# Patient Record
Sex: MALE | Race: WHITE | NOT HISPANIC OR LATINO | URBAN - METROPOLITAN AREA
[De-identification: names, ages, dates, MRNs, and addresses within clinical notes are randomized per-mention and may not be internally consistent; named-entity substitution may affect disease eponyms.]

---

## 2018-10-08 ENCOUNTER — APPOINTMENT (RX ONLY)
Dept: URBAN - METROPOLITAN AREA CLINIC 71 | Facility: CLINIC | Age: 43
Setting detail: DERMATOLOGY
End: 2018-10-08

## 2018-10-08 ENCOUNTER — APPOINTMENT (RX ONLY)
Dept: URBAN - METROPOLITAN AREA CLINIC 70 | Facility: CLINIC | Age: 43
Setting detail: DERMATOLOGY
End: 2018-10-08

## 2018-10-08 DIAGNOSIS — Z71.89 OTHER SPECIFIED COUNSELING: ICD-10-CM

## 2018-10-08 DIAGNOSIS — B07.8 OTHER VIRAL WARTS: ICD-10-CM

## 2018-10-08 DIAGNOSIS — L85.3 XEROSIS CUTIS: ICD-10-CM

## 2018-10-08 PROBLEM — I10 ESSENTIAL (PRIMARY) HYPERTENSION: Status: ACTIVE | Noted: 2018-10-08

## 2018-10-08 PROCEDURE — 17110 DESTRUCTION B9 LES UP TO 14: CPT

## 2018-10-08 PROCEDURE — ? LIQUID NITROGEN

## 2018-10-08 PROCEDURE — 99202 OFFICE O/P NEW SF 15 MIN: CPT | Mod: 25

## 2018-10-08 PROCEDURE — ? COUNSELING

## 2018-10-08 PROCEDURE — ? PRESCRIPTION

## 2018-10-08 RX ORDER — PHARMACY COMPOUNDING ACCESSORY
1 EACH MISCELLANEOUS QHS
Qty: 1 | Refills: 1 | Status: ERX | COMMUNITY
Start: 2018-10-08

## 2018-10-08 RX ADMIN — Medication 1: at 00:00

## 2018-10-08 ASSESSMENT — LOCATION SIMPLE DESCRIPTION DERM
LOCATION SIMPLE: RIGHT THUMB
LOCATION SIMPLE: RIGHT HAND
LOCATION SIMPLE: RIGHT UPPER BACK
LOCATION SIMPLE: RIGHT MIDDLE FINGER
LOCATION SIMPLE: RIGHT THUMB
LOCATION SIMPLE: LEFT HAND
LOCATION SIMPLE: RIGHT RING FINGER
LOCATION SIMPLE: LEFT INDEX FINGER
LOCATION SIMPLE: RIGHT RING FINGER
LOCATION SIMPLE: RIGHT HAND
LOCATION SIMPLE: RIGHT MIDDLE FINGER
LOCATION SIMPLE: LEFT INDEX FINGER
LOCATION SIMPLE: RIGHT UPPER BACK
LOCATION SIMPLE: LEFT HAND

## 2018-10-08 ASSESSMENT — LOCATION DETAILED DESCRIPTION DERM
LOCATION DETAILED: RIGHT DISTAL PALMAR MIDDLE FINGER
LOCATION DETAILED: LEFT DISTAL PALMAR INDEX FINGER
LOCATION DETAILED: LEFT ULNAR PALM
LOCATION DETAILED: LEFT DISTAL PALMAR INDEX FINGER
LOCATION DETAILED: RIGHT THENAR EMINENCE
LOCATION DETAILED: RIGHT DISTAL VENTRAL THUMB
LOCATION DETAILED: RIGHT DISTAL PALMAR RING FINGER
LOCATION DETAILED: RIGHT INFERIOR MEDIAL UPPER BACK
LOCATION DETAILED: RIGHT DISTAL PALMAR MIDDLE FINGER
LOCATION DETAILED: RIGHT THENAR EMINENCE
LOCATION DETAILED: LEFT DISTAL RADIAL PALMAR INDEX FINGER
LOCATION DETAILED: LEFT ULNAR PALM
LOCATION DETAILED: RIGHT DISTAL VENTRAL THUMB
LOCATION DETAILED: RIGHT INFERIOR MEDIAL UPPER BACK
LOCATION DETAILED: RIGHT DISTAL PALMAR RING FINGER
LOCATION DETAILED: LEFT DISTAL RADIAL PALMAR INDEX FINGER

## 2018-10-08 ASSESSMENT — LOCATION ZONE DERM
LOCATION ZONE: FINGER
LOCATION ZONE: TRUNK
LOCATION ZONE: HAND
LOCATION ZONE: TRUNK
LOCATION ZONE: HAND
LOCATION ZONE: FINGER

## 2018-10-08 NOTE — PROCEDURE: LIQUID NITROGEN
Post-Care Instructions: I reviewed with the patient in detail post-care instructions. Patient is to wear sunprotection, and avoid picking at any of the treated lesions. Pt may apply Vaseline to crusted or scabbing areas.
Include Z78.9 (Other Specified Conditions Influencing Health Status) As An Associated Diagnosis?: No
Number Of Freeze-Thaw Cycles: 3 freeze-thaw cycles
Consent: The patient's consent was obtained including but not limited to risks of crusting, scabbing, blistering, scarring, darker or lighter pigmentary change, recurrence, incomplete removal and infection.
Detail Level: Detailed
Medical Necessity Clause: This procedure was medically necessary because the lesions that were treated were:
Medical Necessity Information: It is in your best interest to select a reason for this procedure from the list below. All of these items fulfill various CMS LCD requirements except the new and changing color options.

## 2018-10-08 NOTE — PROCEDURE: LIQUID NITROGEN
Medical Necessity Clause: This procedure was medically necessary because the lesions that were treated were:
Include Z78.9 (Other Specified Conditions Influencing Health Status) As An Associated Diagnosis?: No
Medical Necessity Information: It is in your best interest to select a reason for this procedure from the list below. All of these items fulfill various CMS LCD requirements except the new and changing color options.
Number Of Freeze-Thaw Cycles: 3 freeze-thaw cycles
Post-Care Instructions: I reviewed with the patient in detail post-care instructions. Patient is to wear sunprotection, and avoid picking at any of the treated lesions. Pt may apply Vaseline to crusted or scabbing areas.
Detail Level: Detailed
Consent: The patient's consent was obtained including but not limited to risks of crusting, scabbing, blistering, scarring, darker or lighter pigmentary change, recurrence, incomplete removal and infection.

## 2018-11-12 ENCOUNTER — APPOINTMENT (RX ONLY)
Dept: URBAN - METROPOLITAN AREA CLINIC 71 | Facility: CLINIC | Age: 43
Setting detail: DERMATOLOGY
End: 2018-11-12

## 2018-11-12 ENCOUNTER — APPOINTMENT (RX ONLY)
Dept: URBAN - METROPOLITAN AREA CLINIC 70 | Facility: CLINIC | Age: 43
Setting detail: DERMATOLOGY
End: 2018-11-12

## 2018-11-12 DIAGNOSIS — B07.8 OTHER VIRAL WARTS: ICD-10-CM

## 2018-11-12 DIAGNOSIS — Z71.89 OTHER SPECIFIED COUNSELING: ICD-10-CM

## 2018-11-12 DIAGNOSIS — L85.3 XEROSIS CUTIS: ICD-10-CM

## 2018-11-12 PROBLEM — I10 ESSENTIAL (PRIMARY) HYPERTENSION: Status: ACTIVE | Noted: 2018-11-12

## 2018-11-12 PROCEDURE — ? LIQUID NITROGEN

## 2018-11-12 PROCEDURE — ? COUNSELING

## 2018-11-12 PROCEDURE — ? PRESCRIPTION

## 2018-11-12 PROCEDURE — 17110 DESTRUCTION B9 LES UP TO 14: CPT

## 2018-11-12 PROCEDURE — 99213 OFFICE O/P EST LOW 20 MIN: CPT | Mod: 25

## 2018-11-12 RX ORDER — PHARMACY COMPOUNDING ACCESSORY
1 EACH MISCELLANEOUS QHS
Qty: 1 | Refills: 1 | Status: ERX | COMMUNITY
Start: 2018-11-12

## 2018-11-12 RX ADMIN — Medication 1: at 00:00

## 2018-11-12 ASSESSMENT — LOCATION DETAILED DESCRIPTION DERM
LOCATION DETAILED: LEFT DISTAL PALMAR INDEX FINGER
LOCATION DETAILED: RIGHT INFERIOR MEDIAL UPPER BACK
LOCATION DETAILED: RIGHT THENAR EMINENCE
LOCATION DETAILED: RIGHT PROXIMAL RADIAL DORSAL INDEX FINGER
LOCATION DETAILED: LEFT PROXIMAL DORSAL THUMB
LOCATION DETAILED: LEFT ULNAR PALM
LOCATION DETAILED: RIGHT PROXIMAL RADIAL DORSAL INDEX FINGER
LOCATION DETAILED: LEFT PROXIMAL DORSAL THUMB
LOCATION DETAILED: RIGHT DISTAL PALMAR MIDDLE FINGER
LOCATION DETAILED: RIGHT DISTAL PALMAR RING FINGER
LOCATION DETAILED: LEFT DISTAL ULNAR THUMB
LOCATION DETAILED: LEFT ULNAR PALM
LOCATION DETAILED: RIGHT DISTAL VENTRAL THUMB
LOCATION DETAILED: RIGHT DISTAL VENTRAL THUMB
LOCATION DETAILED: LEFT DISTAL ULNAR THUMB
LOCATION DETAILED: RIGHT DISTAL PALMAR MIDDLE FINGER
LOCATION DETAILED: RIGHT THENAR EMINENCE
LOCATION DETAILED: RIGHT DISTAL PALMAR RING FINGER
LOCATION DETAILED: RIGHT INFERIOR MEDIAL UPPER BACK
LOCATION DETAILED: LEFT DISTAL PALMAR INDEX FINGER

## 2018-11-12 ASSESSMENT — LOCATION SIMPLE DESCRIPTION DERM
LOCATION SIMPLE: RIGHT UPPER BACK
LOCATION SIMPLE: LEFT THUMB
LOCATION SIMPLE: LEFT HAND
LOCATION SIMPLE: RIGHT MIDDLE FINGER
LOCATION SIMPLE: RIGHT RING FINGER
LOCATION SIMPLE: LEFT HAND
LOCATION SIMPLE: LEFT INDEX FINGER
LOCATION SIMPLE: RIGHT UPPER BACK
LOCATION SIMPLE: LEFT THUMB
LOCATION SIMPLE: RIGHT INDEX FINGER
LOCATION SIMPLE: RIGHT RING FINGER
LOCATION SIMPLE: LEFT INDEX FINGER
LOCATION SIMPLE: RIGHT THUMB
LOCATION SIMPLE: RIGHT MIDDLE FINGER
LOCATION SIMPLE: RIGHT HAND
LOCATION SIMPLE: RIGHT INDEX FINGER
LOCATION SIMPLE: RIGHT THUMB
LOCATION SIMPLE: RIGHT HAND

## 2018-11-12 ASSESSMENT — LOCATION ZONE DERM
LOCATION ZONE: FINGER
LOCATION ZONE: HAND
LOCATION ZONE: HAND
LOCATION ZONE: TRUNK
LOCATION ZONE: TRUNK
LOCATION ZONE: FINGER

## 2018-11-12 NOTE — PROCEDURE: LIQUID NITROGEN
Consent: The patient's consent was obtained including but not limited to risks of crusting, scabbing, blistering, scarring, darker or lighter pigmentary change, recurrence, incomplete removal and infection.
Medical Necessity Information: It is in your best interest to select a reason for this procedure from the list below. All of these items fulfill various CMS LCD requirements except the new and changing color options.
Post-Care Instructions: I reviewed with the patient in detail post-care instructions. Patient is to wear sunprotection, and avoid picking at any of the treated lesions. Pt may apply Vaseline to crusted or scabbing areas.
Medical Necessity Clause: This procedure was medically necessary because the lesions that were treated were:
Add 52 Modifier (Optional): no
Total Number Of Lesions Treated: 5
Render Post Care In The Note?: yes
Duration Of Freeze Thaw-Cycle (Seconds): 0
Detail Level: Zone

## 2018-11-12 NOTE — PROCEDURE: LIQUID NITROGEN
Consent: The patient's consent was obtained including but not limited to risks of crusting, scabbing, blistering, scarring, darker or lighter pigmentary change, recurrence, incomplete removal and infection.
Total Number Of Lesions Treated: 5
Detail Level: Zone
Post-Care Instructions: I reviewed with the patient in detail post-care instructions. Patient is to wear sunprotection, and avoid picking at any of the treated lesions. Pt may apply Vaseline to crusted or scabbing areas.
Medical Necessity Information: It is in your best interest to select a reason for this procedure from the list below. All of these items fulfill various CMS LCD requirements except the new and changing color options.
Include Z78.9 (Other Specified Conditions Influencing Health Status) As An Associated Diagnosis?: No
Medical Necessity Clause: This procedure was medically necessary because the lesions that were treated were:
Duration Of Freeze Thaw-Cycle (Seconds): 0
Render Post Care In The Note?: yes

## 2018-12-03 ENCOUNTER — APPOINTMENT (RX ONLY)
Dept: URBAN - METROPOLITAN AREA CLINIC 70 | Facility: CLINIC | Age: 43
Setting detail: DERMATOLOGY
End: 2018-12-03

## 2018-12-03 ENCOUNTER — APPOINTMENT (RX ONLY)
Dept: URBAN - METROPOLITAN AREA CLINIC 71 | Facility: CLINIC | Age: 43
Setting detail: DERMATOLOGY
End: 2018-12-03

## 2018-12-03 DIAGNOSIS — L82.1 OTHER SEBORRHEIC KERATOSIS: ICD-10-CM

## 2018-12-03 DIAGNOSIS — B07.8 OTHER VIRAL WARTS: ICD-10-CM | Status: INADEQUATELY CONTROLLED

## 2018-12-03 DIAGNOSIS — Z71.89 OTHER SPECIFIED COUNSELING: ICD-10-CM

## 2018-12-03 PROCEDURE — ? PRESCRIPTION

## 2018-12-03 PROCEDURE — 99213 OFFICE O/P EST LOW 20 MIN: CPT | Mod: 25

## 2018-12-03 PROCEDURE — ? COUNSELING

## 2018-12-03 PROCEDURE — 17110 DESTRUCTION B9 LES UP TO 14: CPT

## 2018-12-03 PROCEDURE — ? LIQUID NITROGEN

## 2018-12-03 RX ORDER — PHARMACY COMPOUNDING ACCESSORY
1 EACH MISCELLANEOUS QHS
Qty: 1 | Refills: 1 | Status: CANCELLED
Stop reason: ENTERED-IN-ERROR

## 2018-12-03 ASSESSMENT — LOCATION ZONE DERM
LOCATION ZONE: TRUNK
LOCATION ZONE: HAND
LOCATION ZONE: FINGER
LOCATION ZONE: FACE
LOCATION ZONE: HAND
LOCATION ZONE: FACE
LOCATION ZONE: FINGER
LOCATION ZONE: TRUNK

## 2018-12-03 ASSESSMENT — LOCATION SIMPLE DESCRIPTION DERM
LOCATION SIMPLE: RIGHT HAND
LOCATION SIMPLE: LEFT CHEEK
LOCATION SIMPLE: LEFT INDEX FINGER
LOCATION SIMPLE: RIGHT UPPER BACK
LOCATION SIMPLE: RIGHT RING FINGER
LOCATION SIMPLE: LEFT INDEX FINGER
LOCATION SIMPLE: RIGHT MIDDLE FINGER
LOCATION SIMPLE: RIGHT UPPER BACK
LOCATION SIMPLE: RIGHT THUMB
LOCATION SIMPLE: RIGHT HAND
LOCATION SIMPLE: LEFT CHEEK
LOCATION SIMPLE: RIGHT THUMB
LOCATION SIMPLE: RIGHT MIDDLE FINGER
LOCATION SIMPLE: RIGHT RING FINGER

## 2018-12-03 ASSESSMENT — LOCATION DETAILED DESCRIPTION DERM
LOCATION DETAILED: RIGHT THENAR EMINENCE
LOCATION DETAILED: RIGHT DISTAL VENTRAL THUMB
LOCATION DETAILED: RIGHT DISTAL PALMAR MIDDLE FINGER
LOCATION DETAILED: RIGHT DISTAL PALMAR RING FINGER
LOCATION DETAILED: RIGHT DISTAL PALMAR MIDDLE FINGER
LOCATION DETAILED: LEFT CENTRAL MALAR CHEEK
LOCATION DETAILED: LEFT DISTAL PALMAR INDEX FINGER
LOCATION DETAILED: LEFT CENTRAL MALAR CHEEK
LOCATION DETAILED: LEFT DISTAL PALMAR INDEX FINGER
LOCATION DETAILED: RIGHT DISTAL PALMAR RING FINGER
LOCATION DETAILED: RIGHT THENAR EMINENCE
LOCATION DETAILED: RIGHT INFERIOR MEDIAL UPPER BACK
LOCATION DETAILED: RIGHT DISTAL VENTRAL THUMB
LOCATION DETAILED: RIGHT INFERIOR MEDIAL UPPER BACK

## 2018-12-03 NOTE — PROCEDURE: LIQUID NITROGEN
Medical Necessity Information: It is in your best interest to select a reason for this procedure from the list below. All of these items fulfill various CMS LCD requirements except the new and changing color options.
Add 52 Modifier (Optional): no
Medical Necessity Clause: This procedure was medically necessary because the lesions that were treated were:
Duration Of Freeze Thaw-Cycle (Seconds): 0
Total Number Of Lesions Treated: 3
Consent: The patient's consent was obtained including but not limited to risks of crusting, scabbing, blistering, scarring, darker or lighter pigmentary change, recurrence, incomplete removal and infection.
Render Post Care In The Note?: yes
Post-Care Instructions: I reviewed with the patient in detail post-care instructions. Patient is to wear sunprotection, and avoid picking at any of the treated lesions. Pt may apply Vaseline to crusted or scabbing areas.
Detail Level: Zone

## 2018-12-03 NOTE — PROCEDURE: LIQUID NITROGEN
Include Z78.9 (Other Specified Conditions Influencing Health Status) As An Associated Diagnosis?: No
Duration Of Freeze Thaw-Cycle (Seconds): 0
Total Number Of Lesions Treated: 3
Consent: The patient's consent was obtained including but not limited to risks of crusting, scabbing, blistering, scarring, darker or lighter pigmentary change, recurrence, incomplete removal and infection.
Medical Necessity Information: It is in your best interest to select a reason for this procedure from the list below. All of these items fulfill various CMS LCD requirements except the new and changing color options.
Detail Level: Zone
Post-Care Instructions: I reviewed with the patient in detail post-care instructions. Patient is to wear sunprotection, and avoid picking at any of the treated lesions. Pt may apply Vaseline to crusted or scabbing areas.
Medical Necessity Clause: This procedure was medically necessary because the lesions that were treated were:
Render Post Care In The Note?: yes

## 2018-12-28 ENCOUNTER — APPOINTMENT (RX ONLY)
Dept: URBAN - METROPOLITAN AREA CLINIC 70 | Facility: CLINIC | Age: 43
Setting detail: DERMATOLOGY
End: 2018-12-28

## 2018-12-28 ENCOUNTER — APPOINTMENT (RX ONLY)
Dept: URBAN - METROPOLITAN AREA CLINIC 71 | Facility: CLINIC | Age: 43
Setting detail: DERMATOLOGY
End: 2018-12-28

## 2018-12-28 DIAGNOSIS — B07.8 OTHER VIRAL WARTS: ICD-10-CM

## 2018-12-28 PROCEDURE — ? COUNSELING

## 2018-12-28 PROCEDURE — 17110 DESTRUCTION B9 LES UP TO 14: CPT

## 2018-12-28 PROCEDURE — ? LIQUID NITROGEN

## 2018-12-28 ASSESSMENT — LOCATION DETAILED DESCRIPTION DERM
LOCATION DETAILED: LEFT DISTAL PALMAR INDEX FINGER
LOCATION DETAILED: RIGHT DISTAL PALMAR MIDDLE FINGER
LOCATION DETAILED: LEFT DISTAL PALMAR INDEX FINGER
LOCATION DETAILED: RIGHT DISTAL VENTRAL THUMB
LOCATION DETAILED: RIGHT DISTAL PALMAR RING FINGER
LOCATION DETAILED: RIGHT DISTAL PALMAR RING FINGER
LOCATION DETAILED: RIGHT THENAR EMINENCE
LOCATION DETAILED: RIGHT DISTAL PALMAR MIDDLE FINGER
LOCATION DETAILED: RIGHT THENAR EMINENCE
LOCATION DETAILED: RIGHT DISTAL VENTRAL THUMB

## 2018-12-28 ASSESSMENT — LOCATION SIMPLE DESCRIPTION DERM
LOCATION SIMPLE: RIGHT MIDDLE FINGER
LOCATION SIMPLE: RIGHT RING FINGER
LOCATION SIMPLE: RIGHT RING FINGER
LOCATION SIMPLE: RIGHT MIDDLE FINGER
LOCATION SIMPLE: RIGHT HAND
LOCATION SIMPLE: LEFT INDEX FINGER
LOCATION SIMPLE: RIGHT THUMB
LOCATION SIMPLE: LEFT INDEX FINGER
LOCATION SIMPLE: RIGHT HAND
LOCATION SIMPLE: RIGHT THUMB

## 2018-12-28 ASSESSMENT — LOCATION ZONE DERM
LOCATION ZONE: HAND
LOCATION ZONE: HAND
LOCATION ZONE: FINGER
LOCATION ZONE: FINGER

## 2018-12-28 NOTE — PROCEDURE: LIQUID NITROGEN
Medical Necessity Information: It is in your best interest to select a reason for this procedure from the list below. All of these items fulfill various CMS LCD requirements except the new and changing color options.
Duration Of Freeze Thaw-Cycle (Seconds): 0
Render Post Care In The Note?: yes
Post-Care Instructions: I reviewed with the patient in detail post-care instructions. Patient is to wear sunprotection, and avoid picking at any of the treated lesions. Pt may apply Vaseline to crusted or scabbing areas.
Detail Level: Zone
Include Z78.9 (Other Specified Conditions Influencing Health Status) As An Associated Diagnosis?: No
Total Number Of Lesions Treated: 3
Medical Necessity Clause: This procedure was medically necessary because the lesions that were treated were:
Consent: The patient's consent was obtained including but not limited to risks of crusting, scabbing, blistering, scarring, darker or lighter pigmentary change, recurrence, incomplete removal and infection.